# Patient Record
Sex: FEMALE | Race: BLACK OR AFRICAN AMERICAN | NOT HISPANIC OR LATINO
[De-identification: names, ages, dates, MRNs, and addresses within clinical notes are randomized per-mention and may not be internally consistent; named-entity substitution may affect disease eponyms.]

---

## 2019-04-12 ENCOUNTER — TRANSCRIPTION ENCOUNTER (OUTPATIENT)
Age: 24
End: 2019-04-12

## 2024-05-07 ENCOUNTER — EMERGENCY (EMERGENCY)
Facility: HOSPITAL | Age: 29
LOS: 1 days | Discharge: HOME | End: 2024-05-07
Admitting: EMERGENCY MEDICINE
Payer: COMMERCIAL

## 2024-05-07 VITALS
RESPIRATION RATE: 19 BRPM | TEMPERATURE: 99 F | WEIGHT: 175.05 LBS | HEART RATE: 100 BPM | HEIGHT: 63 IN | OXYGEN SATURATION: 99 %

## 2024-05-07 VITALS
TEMPERATURE: 98 F | HEART RATE: 77 BPM | OXYGEN SATURATION: 96 % | RESPIRATION RATE: 18 BRPM | DIASTOLIC BLOOD PRESSURE: 87 MMHG | SYSTOLIC BLOOD PRESSURE: 134 MMHG

## 2024-05-07 LAB
ANION GAP SERPL CALC-SCNC: 11 MMOL/L — SIGNIFICANT CHANGE UP (ref 5–17)
BASOPHILS # BLD AUTO: 0.06 K/UL — SIGNIFICANT CHANGE UP (ref 0–0.2)
BASOPHILS NFR BLD AUTO: 0.5 % — SIGNIFICANT CHANGE UP (ref 0–2)
BUN SERPL-MCNC: 16 MG/DL — SIGNIFICANT CHANGE UP (ref 7–23)
CALCIUM SERPL-MCNC: 9.4 MG/DL — SIGNIFICANT CHANGE UP (ref 8.4–10.5)
CHLORIDE SERPL-SCNC: 104 MMOL/L — SIGNIFICANT CHANGE UP (ref 96–108)
CO2 SERPL-SCNC: 26 MMOL/L — SIGNIFICANT CHANGE UP (ref 22–31)
CREAT SERPL-MCNC: 0.72 MG/DL — SIGNIFICANT CHANGE UP (ref 0.5–1.3)
EGFR: 115 ML/MIN/1.73M2 — SIGNIFICANT CHANGE UP
EOSINOPHIL # BLD AUTO: 0.04 K/UL — SIGNIFICANT CHANGE UP (ref 0–0.5)
EOSINOPHIL NFR BLD AUTO: 0.3 % — SIGNIFICANT CHANGE UP (ref 0–6)
GLUCOSE SERPL-MCNC: 95 MG/DL — SIGNIFICANT CHANGE UP (ref 70–99)
HCG SERPL-ACNC: <1 MIU/ML — SIGNIFICANT CHANGE UP
HCT VFR BLD CALC: 35.6 % — SIGNIFICANT CHANGE UP (ref 34.5–45)
HGB BLD-MCNC: 11.5 G/DL — SIGNIFICANT CHANGE UP (ref 11.5–15.5)
IMM GRANULOCYTES NFR BLD AUTO: 0.3 % — SIGNIFICANT CHANGE UP (ref 0–0.9)
LYMPHOCYTES # BLD AUTO: 1.84 K/UL — SIGNIFICANT CHANGE UP (ref 1–3.3)
LYMPHOCYTES # BLD AUTO: 15.7 % — SIGNIFICANT CHANGE UP (ref 13–44)
MAGNESIUM SERPL-MCNC: 2 MG/DL — SIGNIFICANT CHANGE UP (ref 1.6–2.6)
MCHC RBC-ENTMCNC: 24.3 PG — LOW (ref 27–34)
MCHC RBC-ENTMCNC: 32.3 GM/DL — SIGNIFICANT CHANGE UP (ref 32–36)
MCV RBC AUTO: 75.3 FL — LOW (ref 80–100)
MONOCYTES # BLD AUTO: 0.71 K/UL — SIGNIFICANT CHANGE UP (ref 0–0.9)
MONOCYTES NFR BLD AUTO: 6.1 % — SIGNIFICANT CHANGE UP (ref 2–14)
NEUTROPHILS # BLD AUTO: 9.05 K/UL — HIGH (ref 1.8–7.4)
NEUTROPHILS NFR BLD AUTO: 77.1 % — HIGH (ref 43–77)
NRBC # BLD: 0 /100 WBCS — SIGNIFICANT CHANGE UP (ref 0–0)
PLATELET # BLD AUTO: 359 K/UL — SIGNIFICANT CHANGE UP (ref 150–400)
POTASSIUM SERPL-MCNC: 4 MMOL/L — SIGNIFICANT CHANGE UP (ref 3.5–5.3)
POTASSIUM SERPL-SCNC: 4 MMOL/L — SIGNIFICANT CHANGE UP (ref 3.5–5.3)
RBC # BLD: 4.73 M/UL — SIGNIFICANT CHANGE UP (ref 3.8–5.2)
RBC # FLD: 14.7 % — HIGH (ref 10.3–14.5)
SODIUM SERPL-SCNC: 141 MMOL/L — SIGNIFICANT CHANGE UP (ref 135–145)
WBC # BLD: 11.73 K/UL — HIGH (ref 3.8–10.5)
WBC # FLD AUTO: 11.73 K/UL — HIGH (ref 3.8–10.5)

## 2024-05-07 PROCEDURE — 70450 CT HEAD/BRAIN W/O DYE: CPT | Mod: 26,MC

## 2024-05-07 PROCEDURE — 99284 EMERGENCY DEPT VISIT MOD MDM: CPT

## 2024-05-07 PROCEDURE — 70450 CT HEAD/BRAIN W/O DYE: CPT | Mod: MC

## 2024-05-07 PROCEDURE — 99284 EMERGENCY DEPT VISIT MOD MDM: CPT | Mod: 25

## 2024-05-07 PROCEDURE — 84702 CHORIONIC GONADOTROPIN TEST: CPT

## 2024-05-07 PROCEDURE — 36000 PLACE NEEDLE IN VEIN: CPT

## 2024-05-07 PROCEDURE — 83735 ASSAY OF MAGNESIUM: CPT

## 2024-05-07 PROCEDURE — 36415 COLL VENOUS BLD VENIPUNCTURE: CPT

## 2024-05-07 PROCEDURE — 80048 BASIC METABOLIC PNL TOTAL CA: CPT

## 2024-05-07 PROCEDURE — 85025 COMPLETE CBC W/AUTO DIFF WBC: CPT

## 2024-05-07 NOTE — ED PROVIDER NOTE - PATIENT PORTAL LINK FT
You can access the FollowMyHealth Patient Portal offered by [facility] by registering at the following website: [email or web address]. By joining [employer]’s FollowMyHealth portal, you will also be able to view your health information using other applications (apps) compatible with our system.

## 2024-05-07 NOTE — ED PROVIDER NOTE - CLINICAL SUMMARY MEDICAL DECISION MAKING FREE TEXT BOX
27 yo f with pmh of MDD, BPD, anxiety, cervical stenosis c/o L sided tingling since 5/1. Pt states she felt tingling on the L side of her face all the way down her body. Pt states she went to [place] and when she came home on 5/5 she felt weakness in the L arm and was unable to write her name properly. Pt went from the airport straight to [facility]. Pt states she was waiting a long time and they did not do anything for her and she signed out AMA. Pt states the following day all her symptoms improved except still feels the numbness. Denies HA, dizziness, neck pain, slurred speech, gait abnormalities, fever, chills. VSS. NOrmal neuro exam with no focal deficits. Labs, CTH neg, will refer to neuro, doubt CVA

## 2024-05-07 NOTE — ED PROVIDER NOTE - CARE PROVIDERS DIRECT ADDRESSES
,[email or web address]

## 2024-05-07 NOTE — ED ADULT NURSE NOTE - NSFALLUNIVINTERV_ED_ALL_ED
Bed/Stretcher in lowest position, wheels locked, appropriate side rails in place/Call bell, personal items and telephone in reach/Instruct patient to call for assistance before getting out of bed/chair/stretcher/Non-slip footwear applied when patient is off stretcher/[place] to call system/Physically safe environment - no spills, clutter or unnecessary equipment/Purposeful proactive rounding/Room/bathroom lighting operational, light cord in reach

## 2024-05-07 NOTE — ED ADULT TRIAGE NOTE - CHIEF COMPLAINT QUOTE
L sided tingling x 1 week, 05/01 - 05/05, went to [facility], stated they did nothing but was sure she had a stroke, was never confirmed, s/s have since resolved, but pt. insisted on coming for a med eval. Pt. has extensive pmhx of mental health - MDD, BPD, and anxiety. Crying and anxious in front triage ; ekg done. Denies cp, SOB, or any acute pain or neuro s/s currently. Speaking clear coherent sentences, ambulating steadily, no neuro / focal deficits. Denies SI / HI, AH, or VH.

## 2024-05-07 NOTE — ED PROVIDER NOTE - NSFOLLOWUPINSTRUCTIONS_ED_ALL_ED_FT
Follow up with the neurologist  your labs and head CT were normal    Return to ED for worsening pain, dizziness, headache, weakness, visual changes, slurred speech or any other concerning symptom

## 2024-05-07 NOTE — ED ADULT NURSE NOTE - CHIEF COMPLAINT QUOTE
L sided tingling x 1 week, 05/01 - 05/05, went to [facility], stated they did nothing but was sure she had a stroke, was never confirmed, s/s have since resolved, but pt. insisted on coming for a med eval. Pt. has extensive pmhx of mental health - MDD, BPD, and anxiety. Crying and anxious in front triage ; ekg done. Denies cp, SOB, or any acute pain or neuro s/s currently. Speaking clear coherent sentences, ambulating steadily, no neuro / focal deficits. Denies SI / HI, AH, or VH.

## 2024-05-07 NOTE — ED PROVIDER NOTE - OBJECTIVE STATEMENT
27 yo f with pmh of MDD, BPD, anxiety, cervical stenosis c/o L sided tingling since 5/1. Pt states she felt tingling on the L side of her face all the way down her body. Pt states she went to [place] and when she came home on 5/5 she felt weakness in the L arm and was unable to write her name properly. Pt went from the airport straight to [facility]. Pt states she was waiting a long time and they did not do anything for her and she signed out AMA. Pt states the following day all her symptoms improved except still feels the numbness. Denies HA, dizziness, neck pain, slurred speech, gait abnormalities, fever, chills. Pt states she was very anxious and upset and when she becomes upset her symptoms were worse

## 2024-05-07 NOTE — ED ADULT NURSE NOTE - OBJECTIVE STATEMENT
28yoF PMh of cervical stenosis, [place] palsy, anxiety, depression, came to ED c/o " Stroke like symptoms". Pt states I was in [place] for five days and I started feeling numbness and tingling on the L side of my face and on my arm. I got really bad on the flight home. I was barely able to walk. The airport transferred me to [facility] but they didn't do any examination". Pt states her symptoms are improving, but came requesting head CT and blood work. No neuro deficits noted. Walking with steady gait.

## 2024-05-07 NOTE — ED PROVIDER NOTE - CARE PROVIDER_API CALL
Arabella Carroll  Neurology  [address] [address]  Phone: [phone]  Fax: [phone]  Follow Up Time:     Emiliano Harris  Neurology  [address] [address]  Phone: [phone]  Fax: [phone]  Follow Up Time:

## 2024-05-09 DIAGNOSIS — R20.2 PARESTHESIA OF SKIN: ICD-10-CM

## 2024-05-09 DIAGNOSIS — M48.02 SPINAL STENOSIS, CERVICAL REGION: ICD-10-CM

## 2024-05-09 DIAGNOSIS — F32.9 MAJOR DEPRESSIVE DISORDER, SINGLE EPISODE, UNSPECIFIED: ICD-10-CM

## 2024-05-09 DIAGNOSIS — F41.9 ANXIETY DISORDER, UNSPECIFIED: ICD-10-CM

## 2024-05-20 PROBLEM — Z00.00 ENCOUNTER FOR PREVENTIVE HEALTH EXAMINATION: Status: ACTIVE | Noted: 2024-05-20

## 2024-05-28 ENCOUNTER — APPOINTMENT (OUTPATIENT)
Dept: NEUROLOGY | Facility: CLINIC | Age: 29
End: 2024-05-28
Payer: MEDICAID

## 2024-05-28 ENCOUNTER — LABORATORY RESULT (OUTPATIENT)
Age: 29
End: 2024-05-28

## 2024-05-28 ENCOUNTER — NON-APPOINTMENT (OUTPATIENT)
Age: 29
End: 2024-05-28

## 2024-05-28 VITALS
OXYGEN SATURATION: 100 % | HEART RATE: 72 BPM | DIASTOLIC BLOOD PRESSURE: 79 MMHG | RESPIRATION RATE: 18 BRPM | TEMPERATURE: 98.2 F | WEIGHT: 169 LBS | HEIGHT: 60 IN | BODY MASS INDEX: 33.18 KG/M2 | SYSTOLIC BLOOD PRESSURE: 123 MMHG

## 2024-05-28 DIAGNOSIS — F17.200 NICOTINE DEPENDENCE, UNSPECIFIED, UNCOMPLICATED: ICD-10-CM

## 2024-05-28 DIAGNOSIS — G45.9 TRANSIENT CEREBRAL ISCHEMIC ATTACK, UNSPECIFIED: ICD-10-CM

## 2024-05-28 DIAGNOSIS — R20.0 ANESTHESIA OF SKIN: ICD-10-CM

## 2024-05-28 DIAGNOSIS — N88.2 STRICTURE AND STENOSIS OF CERVIX UTERI: ICD-10-CM

## 2024-05-28 DIAGNOSIS — Z82.49 FAMILY HISTORY OF ISCHEMIC HEART DISEASE AND OTHER DISEASES OF THE CIRCULATORY SYSTEM: ICD-10-CM

## 2024-05-28 DIAGNOSIS — Z83.3 FAMILY HISTORY OF DIABETES MELLITUS: ICD-10-CM

## 2024-05-28 PROCEDURE — 93880 EXTRACRANIAL BILAT STUDY: CPT

## 2024-05-28 PROCEDURE — 99205 OFFICE O/P NEW HI 60 MIN: CPT

## 2024-05-28 PROCEDURE — 93886 INTRACRANIAL COMPLETE STUDY: CPT

## 2024-05-28 RX ORDER — TIZANIDINE HYDROCHLORIDE 4 MG/1
4 CAPSULE ORAL
Refills: 0 | Status: ACTIVE | COMMUNITY

## 2024-05-28 RX ORDER — GABAPENTIN 400 MG/1
400 CAPSULE ORAL
Refills: 0 | Status: ACTIVE | COMMUNITY

## 2024-05-28 RX ORDER — ASPIRIN ENTERIC COATED TABLETS 81 MG 81 MG/1
81 TABLET, DELAYED RELEASE ORAL DAILY
Qty: 90 | Refills: 3 | Status: ACTIVE | COMMUNITY
Start: 2024-05-28 | End: 1900-01-01

## 2024-05-28 RX ORDER — BUPROPION HYDROCHLORIDE 75 MG/1
75 TABLET, FILM COATED ORAL
Refills: 0 | Status: ACTIVE | COMMUNITY

## 2024-05-28 NOTE — HISTORY OF PRESENT ILLNESS
[FreeTextEntry1] : Jacquie Moore is a 29 year old female with PMH of MDD, BPD, anxiety/depression, HTN (recently diagnosed by PCP - noncompliant with medication), cervical stenosis c/o L sided tingling since 5/1/24 presented to [facility] ED on 3/7/24 due to left side tingling from her face down her body which improved with time now presents for ED follow up.   HCT 5/7/24: No evidence of acute intracranial hemorrhage or midline shift. Patient reports coming back on a plane from a recent trip to [place] during which she lost feeling in her arm and had difficulty writing. She was given oxygen during the flight and was transported to [facility] where patient reports that she was not paid attention to. She reports having to drag the left side of her body to use the bathroom and was told that her face was asymmetrical. She reports being able to feel her left side, write again and run on 5/16/24 without issues. Since the beginning of May, she has noted decreased sensation on the left side of her face, arm and leg. She reports some blurry vision in her left eye and is due to see the Ophthalmologist for a check up. She also reports a pulsating feeling on the left side of her head when stressed. She has been under a lot of stress lately due to personal issues with her sister and boyfriend.   She reports a family history of stroke in her mother which she believes is due to hypertension. She continues to report intermittent left face, arm and leg tingling. BP today 129/79. She is not currently participating in PT but tries to walk a lot. She smokes weed to help her anxiety and drinks alcohol socially. She does not have a copy of C-spine MRI for review but did try PT for which she did not note improvement. She recently got an epidural in 4/2024 which provided short term relief from pain but is interested in proceeding with surgery which her orthopedist is hesitant to proceed with. She reports that her back feels worse when it rains and has difficulty standing at times. She follows with her psychiatrist on a regular basis. She also notes intermittent migraines with left sided head numbness/tingling with photophobia and phonophobia that occurs as often as 3-4 times per week. She also notes difficulty focusing at times as well. Patient strongly believes that she had a stroke and becomes upset due to previous experiences with medical staff when she feels like her concerns aren't being taken seriously.

## 2024-05-28 NOTE — ASSESSMENT
[FreeTextEntry1] : Jacquie Moore is a 29 year old female with PMH of MDD, BPD, anxiety, cervical stenosis c/o L sided tingling since 5/1/24 presented to [facility] ED on 3/7/24 due to left side tingling from her face down her body which improved with time now presents for ED follow up. Differentials include TIA vs. complex migraines vs. cervical radiculopathy, however further work up is needed to r/o more sinister causes.   Plan: -MRI head w/wo to r/o stroke/demyelinating disease -Start Aspiring 81 mg daily for stroke prevention; prescription sent to preferred pharmacy -MRA H/N to r/o vascular stenosis -TCDs and carotid US today, will call with results when available -TTE with bubble to r/o PFO -Ziopatch to r/o cardiac arrythmias -Hypercoags due to patient's age and family history of stroke -Obtain copy of C-spine imaging/lumbar spine from PCP to be reviewed at next visit -Continue f/u with psychiatrist for mood management -Counselled on healthy eating (DASH/Mediterranean diet, limiting red meats and fried foods) -Counselled on importance of regular exercise and remaining active -Counselled on f/u with PCP regarding regular health maintenance and prevention, including routine screening -Counselled on signs of stroke BEFAST and to call 911 with any new or worsening neurological symptoms -RTO in 3 months to review work up

## 2024-05-28 NOTE — PHYSICAL EXAM
[FreeTextEntry1] : Alert. Fully oriented. Speech and language are intact. Cranial nerves II-XII are intact. No asymmetry or nystagmus. Motor exam reveals intact strength with individual muscle testing in bilateral upper and lower extremities. No drift. Tone is normal. 95% sensation on L face, LUE/LLE compared to 100% on right side. Finger-to-nose is intact. Gait is normal.

## 2024-05-30 LAB
ALBUMIN SERPL ELPH-MCNC: 4.6 G/DL
ALP BLD-CCNC: 58 U/L
ALT SERPL-CCNC: 16 U/L
ANION GAP SERPL CALC-SCNC: 15 MMOL/L
APO LP(A) SERPL-MCNC: 46.1 NMOL/L
AST SERPL-CCNC: 14 U/L
B2 GLYCOPROT1 IGA SERPL IA-ACNC: 6.9 SAU
B2 GLYCOPROT1 IGG SER-ACNC: <5 SGU
B2 GLYCOPROT1 IGM SER-ACNC: 5.2 SMU
BASOPHILS # BLD AUTO: 0.07 K/UL
BASOPHILS NFR BLD AUTO: 0.9 %
BILIRUB SERPL-MCNC: 0.2 MG/DL
BUN SERPL-MCNC: 13 MG/DL
CALCIUM SERPL-MCNC: 9.8 MG/DL
CARDIOLIPIN AB SER IA-ACNC: NEGATIVE
CARDIOLIPIN IGM SER-MCNC: <5 GPL
CARDIOLIPIN IGM SER-MCNC: <5 MPL
CHLORIDE SERPL-SCNC: 103 MMOL/L
CHOLEST SERPL-MCNC: 151 MG/DL
CO2 SERPL-SCNC: 22 MMOL/L
CREAT SERPL-MCNC: 0.89 MG/DL
EGFR: 89 ML/MIN/1.73M2
EOSINOPHIL # BLD AUTO: 0.38 K/UL
EOSINOPHIL NFR BLD AUTO: 4.7 %
ESTIMATED AVERAGE GLUCOSE: 108 MG/DL
FACT VIII ACT/NOR PPP: 99 %
FOLATE SERPL-MCNC: 10 NG/ML
GLUCOSE SERPL-MCNC: 75 MG/DL
HBA1C MFR BLD HPLC: 5.4 %
HCT VFR BLD CALC: 37.7 %
HDLC SERPL-MCNC: 54 MG/DL
HGB BLD-MCNC: 11.7 G/DL
IMM GRANULOCYTES NFR BLD AUTO: 0.1 %
INR PPP: 0.98 RATIO
LDLC SERPL CALC-MCNC: 82 MG/DL
LYMPHOCYTES # BLD AUTO: 2.23 K/UL
LYMPHOCYTES NFR BLD AUTO: 27.4 %
MAN DIFF?: NORMAL
MCHC RBC-ENTMCNC: 24.2 PG
MCHC RBC-ENTMCNC: 31 GM/DL
MCV RBC AUTO: 78.1 FL
MONOCYTES # BLD AUTO: 0.4 K/UL
MONOCYTES NFR BLD AUTO: 4.9 %
NEUTROPHILS # BLD AUTO: 5.05 K/UL
NEUTROPHILS NFR BLD AUTO: 62 %
NONHDLC SERPL-MCNC: 97 MG/DL
PLATELET # BLD AUTO: 386 K/UL
PLATELET RESPONSE ASPIRIN: 653 ARU
POTASSIUM SERPL-SCNC: 4.2 MMOL/L
PROT C PPP CHRO-ACNC: 105 %
PROT S AG ACT/NOR PPP IA: 96 %
PROT SERPL-MCNC: 7.6 G/DL
PT BLD: 11.2 SEC
RBC # BLD: 4.83 M/UL
RBC # FLD: 16.6 %
SODIUM SERPL-SCNC: 140 MMOL/L
TRIGL SERPL-MCNC: 78 MG/DL
VIT B12 SERPL-MCNC: 526 PG/ML
WBC # FLD AUTO: 8.14 K/UL

## 2024-05-31 LAB
DEPRECATED CARDIOLIPIN IGA SER: <5 APL
PROT S FREE PPP-ACNC: 84 %

## 2024-06-03 LAB — DNA PLOIDY SPEC FC-IMP: NORMAL

## 2024-06-11 ENCOUNTER — TRANSCRIPTION ENCOUNTER (OUTPATIENT)
Age: 29
End: 2024-06-11

## 2024-06-13 ENCOUNTER — NON-APPOINTMENT (OUTPATIENT)
Age: 29
End: 2024-06-13

## 2024-06-13 LAB
HOMOCYSTEINE LEVEL: 14.1 UMOL/L
METHYLMALONATE SERPL-SCNC: 111 NMOL/L

## 2024-06-23 ENCOUNTER — APPOINTMENT (OUTPATIENT)
Dept: MRI IMAGING | Facility: HOSPITAL | Age: 29
End: 2024-06-23

## 2024-06-23 ENCOUNTER — OUTPATIENT (OUTPATIENT)
Dept: OUTPATIENT SERVICES | Facility: HOSPITAL | Age: 29
LOS: 1 days | End: 2024-06-23
Payer: MEDICAID

## 2024-06-23 PROCEDURE — 70544 MR ANGIOGRAPHY HEAD W/O DYE: CPT

## 2024-06-23 PROCEDURE — 70547 MR ANGIOGRAPHY NECK W/O DYE: CPT | Mod: 26

## 2024-06-23 PROCEDURE — A9585: CPT

## 2024-06-23 PROCEDURE — 70547 MR ANGIOGRAPHY NECK W/O DYE: CPT

## 2024-06-23 PROCEDURE — 70553 MRI BRAIN STEM W/O & W/DYE: CPT | Mod: 26

## 2024-06-23 PROCEDURE — 70544 MR ANGIOGRAPHY HEAD W/O DYE: CPT | Mod: 26,59

## 2024-06-23 PROCEDURE — 70553 MRI BRAIN STEM W/O & W/DYE: CPT

## 2024-06-26 ENCOUNTER — NON-APPOINTMENT (OUTPATIENT)
Age: 29
End: 2024-06-26

## 2024-07-03 ENCOUNTER — APPOINTMENT (OUTPATIENT)
Dept: NEUROLOGY | Facility: CLINIC | Age: 29
End: 2024-07-03

## 2024-08-16 ENCOUNTER — APPOINTMENT (OUTPATIENT)
Dept: NEUROLOGY | Facility: CLINIC | Age: 29
End: 2024-08-16

## 2024-08-16 NOTE — ASSESSMENT
[FreeTextEntry1] : Jacquie Moore is a 29 year old female with PMH of MDD, BPD, anxiety/depression, HTN (recently diagnosed by PCP - noncompliant with medication), cervical stenosis with L sided tingling since 5/2024 who episode of left face, arm and leg tingling in 3/2024 presents for neurological follow up. Etiology of symptoms more likely complex migraine vs. cervical radiculopathy.  Plan: -Continue Aspirin x6 months for secondary stroke prevention -Complete TTE with bubble and Ziopatch to complete TIA work up -Send over copy of previous spine imaging for review -Continue f/u with psychiatrist for mood management -Start headache journal to identify triggers, frequency and response to medications -Start Magnesium Glycinate 400 mg daily at bedtime for migraine prevention; samples given for Ubrelvy for migraine use -Counselled on healthy eating (DASH/Mediterranean diet, limiting red meats and fried foods) -Counselled on importance of regular exercise and remaining active -Counselled on f/u with PCP regarding regular health maintenance and prevention, including routine screening -Counselled on signs of stroke BEFAST and to call 911 with any new or worsening neurological symptoms -RTO after cardiac work up has been completed; can follow up with Dr. Carroll's team for MS r/o

## 2024-08-21 ENCOUNTER — APPOINTMENT (OUTPATIENT)
Dept: NEUROLOGY | Facility: CLINIC | Age: 29
End: 2024-08-21